# Patient Record
Sex: FEMALE | Race: WHITE | Employment: UNEMPLOYED | URBAN - METROPOLITAN AREA
[De-identification: names, ages, dates, MRNs, and addresses within clinical notes are randomized per-mention and may not be internally consistent; named-entity substitution may affect disease eponyms.]

---

## 2021-06-04 ENCOUNTER — OFFICE VISIT (OUTPATIENT)
Dept: INTERNAL MEDICINE CLINIC | Age: 31
End: 2021-06-04
Payer: COMMERCIAL

## 2021-06-04 VITALS
HEIGHT: 63 IN | DIASTOLIC BLOOD PRESSURE: 60 MMHG | WEIGHT: 123 LBS | HEART RATE: 81 BPM | OXYGEN SATURATION: 99 % | TEMPERATURE: 97.7 F | SYSTOLIC BLOOD PRESSURE: 122 MMHG | BODY MASS INDEX: 21.79 KG/M2

## 2021-06-04 DIAGNOSIS — D50.8 IRON DEFICIENCY ANEMIA SECONDARY TO INADEQUATE DIETARY IRON INTAKE: ICD-10-CM

## 2021-06-04 DIAGNOSIS — J06.9 UPPER RESPIRATORY TRACT INFECTION, UNSPECIFIED TYPE: Primary | ICD-10-CM

## 2021-06-04 DIAGNOSIS — J45.20 MILD INTERMITTENT ASTHMA WITHOUT COMPLICATION: ICD-10-CM

## 2021-06-04 PROCEDURE — 99203 OFFICE O/P NEW LOW 30 MIN: CPT | Performed by: NURSE PRACTITIONER

## 2021-06-04 RX ORDER — AZITHROMYCIN 250 MG/1
TABLET, FILM COATED ORAL
COMMUNITY
Start: 2021-06-03 | End: 2021-09-29

## 2021-06-04 RX ORDER — ALBUTEROL SULFATE 90 UG/1
2 AEROSOL, METERED RESPIRATORY (INHALATION) EVERY 6 HOURS PRN
Qty: 1 INHALER | Refills: 3 | Status: SHIPPED | OUTPATIENT
Start: 2021-06-04 | End: 2022-06-15

## 2021-06-04 RX ORDER — FLUTICASONE PROPIONATE 50 MCG
SPRAY, SUSPENSION (ML) NASAL
COMMUNITY
Start: 2021-06-03

## 2021-06-04 RX ORDER — PREDNISONE 20 MG/1
TABLET ORAL
Qty: 10 TABLET | Refills: 0 | Status: SHIPPED | OUTPATIENT
Start: 2021-06-04 | End: 2021-09-29

## 2021-06-04 RX ORDER — ACETAMINOPHEN AND CODEINE PHOSPHATE 120; 12 MG/5ML; MG/5ML
SOLUTION ORAL
COMMUNITY
Start: 2021-04-14 | End: 2021-09-29

## 2021-06-04 RX ORDER — VITAMIN C, CALCIUM, IRON, VITAMIN D3, VITAMIN E, THIAMIN, RIBOFLAVIN, NIACINAMIDE, VITAMIN B6, FOLIC ACID, IODINE, ZINC, COPPER, DOCUSATE SODIUM 120; 85; 30; 3; 20; 20; 1; 25; 2; 50; 159; 4.54; 150; 5; 400; 3.4 MG/1; MG/1; [IU]/1; MG/1; MG/1; MG/1; MG/1; MG/1; MG/1; MG/1; MG/1; MG/1; UG/1; MG/1; [IU]/1; MG/1
TABLET ORAL
COMMUNITY
Start: 2021-06-03 | End: 2022-06-15

## 2021-06-04 NOTE — PROGRESS NOTES
Stanley Gottron  1990      HPI:  Chief Complaint   Patient presents with    Congestion    Nasal Congestion     Daughter had congestion and pulling ears 6 days ago. Croup dx. Pt had sinus symptoms started x 5 days. Nasal congestion, pressure in sinus, chest congestion. Intermittent SOB/tight. No wheezing. No fever. No sore throat. Ear pressure/ L ear. Breastfeeding. Sinus rinse, IBU, Tylenol, Benadryl - minimal relief. Hx breathing treatments 2019. Steroid injection needed at that time. Hx exercise induced asthma. Hx anemia, no updated bw.     /60 (Site: Right Upper Arm, Position: Sitting, Cuff Size: Large Adult)   Pulse 81   Temp 97.7 °F (36.5 °C) (Temporal)   Ht 5' 3\" (1.6 m)   Wt 123 lb (55.8 kg)   SpO2 99%   Breastfeeding Yes   BMI 21.79 kg/m²     Prior to Visit Medications    Medication Sig Taking? Authorizing Provider   predniSONE (DELTASONE) 20 MG tablet 2 po daily x 5 days Yes DOUG Arceo CNP   albuterol sulfate  (90 Base) MCG/ACT inhaler Inhale 2 puffs into the lungs every 6 hours as needed for Wheezing Yes DOUG Reynolds CNP   azithromycin (ZITHROMAX) 250 MG tablet   Historical Provider, MD   fluticasone (FLONASE) 50 MCG/ACT nasal spray   Historical Provider, MD   norethindrone (MICRONOR) 0.35 MG tablet TAKE 1 TABLET BY MOUTH EVERY DAY  Historical Provider, MD Fiore w/o A-FeCbGl-DSS-FA-DHA (CITRANATAL 90 DHA) 90-1 & 300 MG [facility]   Historical Provider, MD     No family history on file.   Social History     Socioeconomic History    Marital status:      Spouse name: Not on file    Number of children: Not on file    Years of education: Not on file    Highest education level: Not on file   Occupational History    Not on file   Tobacco Use    Smoking status: Never Smoker    Smokeless tobacco: Never Used   Substance and Sexual Activity    Alcohol use: Yes     Comment: social    Drug use: Never    Sexual activity: Yes   Other Topics Concern    Not on file   Social History Narrative    Not on file     Social Determinants of Health     Financial Resource Strain:     Difficulty of Paying Living Expenses:    Food Insecurity:     Worried About Running Out of Food in the Last Year:     [address] in the Last Year:    Transportation Needs:     Lack of Transportation (Medical):  Lack of Transportation (Non-Medical):    Physical Activity:     Days of Exercise per Week:     Minutes of Exercise per Session:    Stress:     Feeling of Stress :    Social Connections:     Frequency of Communication with Friends and Family:     Frequency of Social Gatherings with Friends and Family:     Attends Mosque Services:     Active Member of Clubs or Organizations:     Attends Club or Organization Meetings:     Marital Status:    Intimate Partner Violence:     Fear of Current or Ex-Partner:     Emotionally Abused:     Physically Abused:     Sexually Abused:        Review of Systems   Constitutional: Negative for appetite change, chills, fatigue, fever and unexpected weight change. HENT: Positive for congestion, ear pain (L), rhinorrhea and sinus pressure. Negative for ear discharge, facial swelling, hearing loss, sneezing and sore throat. Respiratory: Positive for cough and chest tightness. Negative for shortness of breath and wheezing. Cardiovascular: Negative for chest pain. Gastrointestinal: Negative for diarrhea, nausea and vomiting. Genitourinary: Negative for difficulty urinating, dysuria, hematuria and urgency. Musculoskeletal: Negative for arthralgias and gait problem. Neurological: Negative for dizziness, weakness and headaches. Hematological: Negative for adenopathy. Psychiatric/Behavioral: Negative for sleep disturbance and suicidal ideas. Physical Exam  Vitals reviewed. Constitutional:       Appearance: She is normal weight. HENT:      Head: Normocephalic.       Right Ear: Tympanic membrane, ear canal and external ear normal.      Left Ear: Tympanic membrane, ear canal and external ear normal.      Nose: Nose normal.      Mouth/Throat:      Mouth: Mucous membranes are moist.      Pharynx: Oropharynx is clear. Eyes:      Extraocular Movements: Extraocular movements intact. Conjunctiva/sclera: Conjunctivae normal.      Pupils: Pupils are equal, round, and reactive to light. Cardiovascular:      Rate and Rhythm: Normal rate and regular rhythm. Pulses: Normal pulses. Heart sounds: Normal heart sounds. Pulmonary:      Effort: Pulmonary effort is normal.      Breath sounds: Normal breath sounds. Lymphadenopathy:      Cervical: No cervical adenopathy. Neurological:      General: No focal deficit present. Mental Status: She is alert and oriented to person, place, and time. Assessment:     1. Upper respiratory tract infection, unspecified type  Finish zpak given to pt by OBGYN. Start Prednisone and Albuterol inh prn. Use OTC supportive care, anti-histamine, Flonase NS.     - predniSONE (DELTASONE) 20 MG tablet; 2 po daily x 5 days  Dispense: 10 tablet; Refill: 0  - albuterol sulfate  (90 Base) MCG/ACT inhaler; Inhale 2 puffs into the lungs every 6 hours as needed for Wheezing  Dispense: 1 Inhaler; Refill: 3    2. Mild intermittent asthma without complication  Monitor closely. Stable. Use Albuterol inh prn    3. Iron deficiency anemia secondary to inadequate dietary iron intake  Monitor, stable. F/u bw in future with physical      Plan:    See above plan. Return if symptoms worsen or fail to improve, for Physical 2021, FBW.     Earnest Castillo, APRN - CNP

## 2021-06-07 ASSESSMENT — ENCOUNTER SYMPTOMS
NAUSEA: 0
FACIAL SWELLING: 0
DIARRHEA: 0
SORE THROAT: 0
VOMITING: 0
COUGH: 1
SINUS PRESSURE: 1
RHINORRHEA: 1
WHEEZING: 0
SHORTNESS OF BREATH: 0
CHEST TIGHTNESS: 1

## 2021-06-28 RX ORDER — VALACYCLOVIR HYDROCHLORIDE 1 G/1
2000 TABLET, FILM COATED ORAL 2 TIMES DAILY
Qty: 4 TABLET | Refills: 0 | Status: SHIPPED | OUTPATIENT
Start: 2021-06-28 | End: 2021-09-29

## 2021-09-23 ENCOUNTER — TELEPHONE (OUTPATIENT)
Dept: INTERNAL MEDICINE CLINIC | Age: 31
End: 2021-09-23

## 2021-09-23 NOTE — TELEPHONE ENCOUNTER
----- Message from Zelda Boykins sent at 9/23/2021  1:52 PM EDT -----  Subject: Message to Provider    QUESTIONS  Information for Provider? Patient called to switch her appt back to 9-29   at 1:00. Spoke with someone at  who said they would change her   appt back to the 9-29-21 at 1:00 and to let patient know it was handled. Please advise patient if not able to change appt back to the 9-29 at 1:00.   11-4 can be canceled. ---------------------------------------------------------------------------  --------------  AdolfoSnapTell INFO  What is the best way for the office to contact you? OK to leave message on   voicemail  Preferred Call Back Phone Number? [phone]  ---------------------------------------------------------------------------  --------------  SCRIPT ANSWERS  Relationship to Patient?  Self

## 2021-09-29 ENCOUNTER — TELEPHONE (OUTPATIENT)
Dept: INTERNAL MEDICINE CLINIC | Age: 31
End: 2021-09-29

## 2021-09-29 ENCOUNTER — OFFICE VISIT (OUTPATIENT)
Dept: INTERNAL MEDICINE CLINIC | Age: 31
End: 2021-09-29
Payer: COMMERCIAL

## 2021-09-29 VITALS
HEIGHT: 63 IN | DIASTOLIC BLOOD PRESSURE: 64 MMHG | HEART RATE: 82 BPM | BODY MASS INDEX: 21.26 KG/M2 | SYSTOLIC BLOOD PRESSURE: 118 MMHG | OXYGEN SATURATION: 99 % | WEIGHT: 120 LBS

## 2021-09-29 DIAGNOSIS — M62.08 DIASTASIS RECTI: Primary | ICD-10-CM

## 2021-09-29 DIAGNOSIS — N32.81 OVERACTIVE BLADDER: ICD-10-CM

## 2021-09-29 DIAGNOSIS — J45.20 MILD INTERMITTENT ASTHMA WITHOUT COMPLICATION: ICD-10-CM

## 2021-09-29 DIAGNOSIS — M62.08 DIASTASIS RECTI: ICD-10-CM

## 2021-09-29 DIAGNOSIS — Z00.00 ROUTINE GENERAL MEDICAL EXAMINATION AT A HEALTH CARE FACILITY: Primary | ICD-10-CM

## 2021-09-29 PROCEDURE — 99395 PREV VISIT EST AGE 18-39: CPT | Performed by: NURSE PRACTITIONER

## 2021-09-29 RX ORDER — VITAMIN B COMPLEX
1 CAPSULE ORAL DAILY
COMMUNITY

## 2021-09-29 RX ORDER — ZINC GLUCONATE 50 MG
50 TABLET ORAL DAILY
COMMUNITY

## 2021-09-29 RX ORDER — MAGNESIUM 30 MG
30 TABLET ORAL DAILY
COMMUNITY

## 2021-09-29 RX ORDER — OMEGA-3S/DHA/EPA/FISH OIL/D3 300MG-1000
400 CAPSULE ORAL DAILY
COMMUNITY

## 2021-09-29 ASSESSMENT — ENCOUNTER SYMPTOMS
COUGH: 0
FACIAL SWELLING: 0
NAUSEA: 0
SINUS PRESSURE: 0
DIARRHEA: 0
VOMITING: 0
SORE THROAT: 0

## 2021-09-29 ASSESSMENT — PATIENT HEALTH QUESTIONNAIRE - PHQ9
1. LITTLE INTEREST OR PLEASURE IN DOING THINGS: 0
SUM OF ALL RESPONSES TO PHQ9 QUESTIONS 1 & 2: 0
2. FEELING DOWN, DEPRESSED OR HOPELESS: 0
SUM OF ALL RESPONSES TO PHQ QUESTIONS 1-9: 0

## 2021-09-29 NOTE — PROGRESS NOTES
Karla Gonzalez  1990        Chief Complaint   Patient presents with    Annual Exam    Gynecologic Exam     [address]     Immunizations     going to get records        Assessment/Plan:     1. Routine general medical examination at a health care facility  - Labs   - CBC Auto Differential; Future   - Comprehensive Metabolic Panel; Future   - Lipid Panel; Future  - Flu vaccine recommended and educated pt   - COVID vaccine recommended and educated pt    2. Overactive bladder  - Monitor, symptoms stable. No medication at this time. 3. Mild intermittent asthma without complication  - Stable, Albuterol PRN    Diastasis Recti  Will review with PT and see if we can get patient established with PT    HPI:   Doing well overall. Has 3 children, all girls under the ages of 8. She is still breastfeeding. Overactive bladder. Dx freshman year in college and was on medication. She got off medications due to pregnancy and breastfeeding. Asthma. Stable. Denies cough/SOB. Pap smear: 2021  Flu vaccine: due      /64 (Site: Right Upper Arm, Position: Sitting, Cuff Size: Large Adult)   Pulse 82   Ht 5' 3\" (1.6 m)   Wt 120 lb (54.4 kg)   LMP  (Approximate)   SpO2 99%   Breastfeeding Yes   BMI 21.26 kg/m²     Prior to Visit Medications    Medication Sig Taking? Authorizing Provider   vitamin D3 (CHOLECALCIFEROL) 10 MCG (400 UNIT) TABS tablet Take 400 Units by mouth daily Yes Historical Provider, MD   b complex vitamins capsule Take 1 capsule by mouth daily Yes Historical Provider, MD   zinc gluconate 50 MG tablet Take 50 mg by mouth daily Yes Historical Provider, MD   magnesium 30 MG tablet Take 30 mg by mouth daily Yes Historical Provider, MD   ELDERBERRY PO Take by mouth daily Yes Historical Provider, MD   fluticasone (FLONASE) 50 MCG/ACT nasal spray Several times per year.  Spring and fall Yes Historical Provider, MD Fiore w/o A-FeCbGl-DSS-FA-DHA (CITRANATAL 90 DHA) 90-1 & 300 MG MISC  Yes Thought Content: Thought content normal.             See above plan.      Return in about 1 year (around 9/29/2022) for Physical.    DOUG Mitchell - CNP

## 2021-09-30 NOTE — TELEPHONE ENCOUNTER
Pt informed she would like the referral . She has the scheduling information.  She would like to go to [place]

## 2021-09-30 NOTE — TELEPHONE ENCOUNTER
Please tell patient that she could perform a few sessions with Pelvic Floor Therapy at [facility] location.  If she would like to, I can send referral.

## 2021-10-12 ENCOUNTER — TELEPHONE (OUTPATIENT)
Dept: INTERNAL MEDICINE CLINIC | Age: 31
End: 2021-10-12

## 2021-10-13 ENCOUNTER — TELEPHONE (OUTPATIENT)
Dept: INTERNAL MEDICINE CLINIC | Age: 31
End: 2021-10-13

## 2021-10-13 ENCOUNTER — HOSPITAL ENCOUNTER (OUTPATIENT)
Age: 31
Discharge: HOME | End: 2021-10-13
Payer: COMMERCIAL

## 2021-10-13 DIAGNOSIS — Z86.16 HISTORY OF COVID-19: Primary | ICD-10-CM

## 2021-10-13 DIAGNOSIS — Z00.00 ROUTINE GENERAL MEDICAL EXAMINATION AT A HEALTH CARE FACILITY: ICD-10-CM

## 2021-10-13 LAB
A/G RATIO: 2 (ref 1.1–2.2)
ALBUMIN SERPL-MCNC: 4.9 G/DL (ref 3.4–5)
ALP BLD-CCNC: 83 U/L (ref 40–129)
ALT SERPL-CCNC: 19 U/L (ref 10–40)
ANION GAP SERPL CALCULATED.3IONS-SCNC: 10 MMOL/L (ref 3–16)
AST SERPL-CCNC: 18 U/L (ref 15–37)
BASOPHILS ABSOLUTE: 0 K/UL (ref 0–0.2)
BASOPHILS RELATIVE PERCENT: 0.6 %
BILIRUB SERPL-MCNC: 0.4 MG/DL (ref 0–1)
BUN BLDV-MCNC: 16 MG/DL (ref 7–20)
CALCIUM SERPL-MCNC: 9.8 MG/DL (ref 8.3–10.6)
CHLORIDE BLD-SCNC: 103 MMOL/L (ref 99–110)
CHOLESTEROL, TOTAL: 192 MG/DL (ref 0–199)
CO2: 28 MMOL/L (ref 21–32)
CREAT SERPL-MCNC: 0.7 MG/DL (ref 0.6–1.1)
EOSINOPHILS ABSOLUTE: 0.1 K/UL (ref 0–0.6)
EOSINOPHILS RELATIVE PERCENT: 1.5 %
GFR AFRICAN AMERICAN: >60
GFR NON-AFRICAN AMERICAN: >60
GLOBULIN: 2.4 G/DL
GLUCOSE BLD-MCNC: 80 MG/DL (ref 70–99)
HCT VFR BLD CALC: 39.5 % (ref 36–48)
HDLC SERPL-MCNC: 65 MG/DL (ref 40–60)
HEMOGLOBIN: 13.2 G/DL (ref 12–16)
LDL CHOLESTEROL CALCULATED: 117 MG/DL
LYMPHOCYTES ABSOLUTE: 2.3 K/UL (ref 1–5.1)
LYMPHOCYTES RELATIVE PERCENT: 40.4 %
MCH RBC QN AUTO: 30.2 PG (ref 26–34)
MCHC RBC AUTO-ENTMCNC: 33.4 G/DL (ref 31–36)
MCV RBC AUTO: 90.5 FL (ref 80–100)
MONOCYTES ABSOLUTE: 0.5 K/UL (ref 0–1.3)
MONOCYTES RELATIVE PERCENT: 7.9 %
NEUTROPHILS ABSOLUTE: 2.9 K/UL (ref 1.7–7.7)
NEUTROPHILS RELATIVE PERCENT: 49.6 %
PDW BLD-RTO: 13 % (ref 12.4–15.4)
PLATELET # BLD: 193 K/UL (ref 135–450)
PMV BLD AUTO: 8.8 FL (ref 5–10.5)
POTASSIUM SERPL-SCNC: 4.3 MMOL/L (ref 3.5–5.1)
RBC # BLD: 4.37 M/UL (ref 4–5.2)
SARS-COV-2 ANTIBODY, TOTAL: POSITIVE
SODIUM BLD-SCNC: 141 MMOL/L (ref 136–145)
TOTAL PROTEIN: 7.3 G/DL (ref 6.4–8.2)
TRIGL SERPL-MCNC: 49 MG/DL (ref 0–150)
VLDLC SERPL CALC-MCNC: 10 MG/DL
WBC # BLD: 5.8 K/UL (ref 4–11)

## 2021-10-13 PROCEDURE — 85025 COMPLETE CBC W/AUTO DIFF WBC: CPT

## 2021-10-13 PROCEDURE — 86769 SARS-COV-2 COVID-19 ANTIBODY: CPT

## 2021-10-13 PROCEDURE — 80053 COMPREHEN METABOLIC PANEL: CPT

## 2021-10-13 PROCEDURE — 36415 COLL VENOUS BLD VENIPUNCTURE: CPT

## 2021-10-13 PROCEDURE — 80061 LIPID PANEL: CPT

## 2021-10-13 NOTE — TELEPHONE ENCOUNTER
Spoke with pt , she wants to go ahead and get the test.   She was covid testing earlier in the year . She declines the vaccine due to having had covid. Unsure of exact date of exposure / sx .

## 2021-10-13 NOTE — TELEPHONE ENCOUNTER
I can add COVID antibody testing however I like patients to know that this may not be covered by insurance as no matter where the antibody testing returns for level, the recommendation is a COVID vaccine. So she might want to find out if insurance will cover.

## 2022-06-15 ENCOUNTER — OFFICE VISIT (OUTPATIENT)
Dept: INTERNAL MEDICINE CLINIC | Age: 32
End: 2022-06-15
Payer: COMMERCIAL

## 2022-06-15 ENCOUNTER — TELEPHONE (OUTPATIENT)
Dept: INTERNAL MEDICINE CLINIC | Age: 32
End: 2022-06-15

## 2022-06-15 VITALS
BODY MASS INDEX: 23.37 KG/M2 | SYSTOLIC BLOOD PRESSURE: 118 MMHG | HEART RATE: 80 BPM | DIASTOLIC BLOOD PRESSURE: 80 MMHG | OXYGEN SATURATION: 99 % | HEIGHT: 62 IN | RESPIRATION RATE: 12 BRPM | WEIGHT: 127 LBS | TEMPERATURE: 98.1 F

## 2022-06-15 DIAGNOSIS — R00.0 TACHYCARDIA: Primary | ICD-10-CM

## 2022-06-15 LAB
A/G RATIO: 2.1 (ref 1.1–2.2)
ALBUMIN SERPL-MCNC: 4.9 G/DL (ref 3.4–5)
ALP BLD-CCNC: 69 U/L (ref 40–129)
ALT SERPL-CCNC: 23 U/L (ref 10–40)
ANION GAP SERPL CALCULATED.3IONS-SCNC: 16 MMOL/L (ref 3–16)
AST SERPL-CCNC: 23 U/L (ref 15–37)
BILIRUB SERPL-MCNC: 0.4 MG/DL (ref 0–1)
BUN BLDV-MCNC: 14 MG/DL (ref 7–20)
CALCIUM SERPL-MCNC: 9.7 MG/DL (ref 8.3–10.6)
CHLORIDE BLD-SCNC: 98 MMOL/L (ref 99–110)
CO2: 24 MMOL/L (ref 21–32)
CREAT SERPL-MCNC: 0.6 MG/DL (ref 0.6–1.1)
GFR AFRICAN AMERICAN: >60
GFR NON-AFRICAN AMERICAN: >60
GLUCOSE BLD-MCNC: 62 MG/DL (ref 70–99)
MAGNESIUM: 2.1 MG/DL (ref 1.8–2.4)
POTASSIUM SERPL-SCNC: 3.6 MMOL/L (ref 3.5–5.1)
SODIUM BLD-SCNC: 138 MMOL/L (ref 136–145)
TOTAL PROTEIN: 7.2 G/DL (ref 6.4–8.2)
TSH REFLEX: 0.9 UIU/ML (ref 0.27–4.2)

## 2022-06-15 PROCEDURE — 93000 ELECTROCARDIOGRAM COMPLETE: CPT | Performed by: NURSE PRACTITIONER

## 2022-06-15 PROCEDURE — 99214 OFFICE O/P EST MOD 30 MIN: CPT | Performed by: NURSE PRACTITIONER

## 2022-06-15 SDOH — ECONOMIC STABILITY: FOOD INSECURITY: WITHIN THE PAST 12 MONTHS, YOU WORRIED THAT YOUR FOOD WOULD RUN OUT BEFORE YOU GOT MONEY TO BUY MORE.: NEVER TRUE

## 2022-06-15 SDOH — ECONOMIC STABILITY: FOOD INSECURITY: WITHIN THE PAST 12 MONTHS, THE FOOD YOU BOUGHT JUST DIDN'T LAST AND YOU DIDN'T HAVE MONEY TO GET MORE.: NEVER TRUE

## 2022-06-15 ASSESSMENT — ANXIETY QUESTIONNAIRES
6. BECOMING EASILY ANNOYED OR IRRITABLE: 0
7. FEELING AFRAID AS IF SOMETHING AWFUL MIGHT HAPPEN: 0
GAD7 TOTAL SCORE: 0
1. FEELING NERVOUS, ANXIOUS, OR ON EDGE: 0
IF YOU CHECKED OFF ANY PROBLEMS ON THIS QUESTIONNAIRE, HOW DIFFICULT HAVE THESE PROBLEMS MADE IT FOR YOU TO DO YOUR WORK, TAKE CARE OF THINGS AT HOME, OR GET ALONG WITH OTHER PEOPLE: NOT DIFFICULT AT ALL
5. BEING SO RESTLESS THAT IT IS HARD TO SIT STILL: 0
2. NOT BEING ABLE TO STOP OR CONTROL WORRYING: 0
3. WORRYING TOO MUCH ABOUT DIFFERENT THINGS: 0
4. TROUBLE RELAXING: 0

## 2022-06-15 ASSESSMENT — SOCIAL DETERMINANTS OF HEALTH (SDOH): HOW HARD IS IT FOR YOU TO PAY FOR THE VERY BASICS LIKE FOOD, HOUSING, MEDICAL CARE, AND HEATING?: NOT HARD AT ALL

## 2022-06-15 ASSESSMENT — PATIENT HEALTH QUESTIONNAIRE - PHQ9
10. IF YOU CHECKED OFF ANY PROBLEMS, HOW DIFFICULT HAVE THESE PROBLEMS MADE IT FOR YOU TO DO YOUR WORK, TAKE CARE OF THINGS AT HOME, OR GET ALONG WITH OTHER PEOPLE: 0
SUM OF ALL RESPONSES TO PHQ QUESTIONS 1-9: 0
SUM OF ALL RESPONSES TO PHQ QUESTIONS 1-9: 0
8. MOVING OR SPEAKING SO SLOWLY THAT OTHER PEOPLE COULD HAVE NOTICED. OR THE OPPOSITE, BEING SO FIGETY OR RESTLESS THAT YOU HAVE BEEN MOVING AROUND A LOT MORE THAN USUAL: 0
3. TROUBLE FALLING OR STAYING ASLEEP: 0
SUM OF ALL RESPONSES TO PHQ QUESTIONS 1-9: 0
1. LITTLE INTEREST OR PLEASURE IN DOING THINGS: 0
SUM OF ALL RESPONSES TO PHQ QUESTIONS 1-9: 0
6. FEELING BAD ABOUT YOURSELF - OR THAT YOU ARE A FAILURE OR HAVE LET YOURSELF OR YOUR FAMILY DOWN: 0
4. FEELING TIRED OR HAVING LITTLE ENERGY: 0
2. FEELING DOWN, DEPRESSED OR HOPELESS: 0
5. POOR APPETITE OR OVEREATING: 0
7. TROUBLE CONCENTRATING ON THINGS, SUCH AS READING THE NEWSPAPER OR WATCHING TELEVISION: 0
9. THOUGHTS THAT YOU WOULD BE BETTER OFF DEAD, OR OF HURTING YOURSELF: 0
SUM OF ALL RESPONSES TO PHQ9 QUESTIONS 1 & 2: 0

## 2022-06-15 ASSESSMENT — ENCOUNTER SYMPTOMS
DIARRHEA: 0
VOMITING: 0
CHEST TIGHTNESS: 0
SHORTNESS OF BREATH: 0
NAUSEA: 0
CONSTIPATION: 0
ABDOMINAL DISTENTION: 0

## 2022-06-15 NOTE — TELEPHONE ENCOUNTER
Nurse triage / complaint of random dizzy spells an increased heart rate. Increase in the past few weeks. Was recently on a plane trip. Denies any cold / viral sx. Has increased food and hydration . She was able to play a round of [place] yesterday [place] [employer] . While there she had an episode . Felt close to passing out but did not. Apple watch is showing increased heart rate 160's. She has had a few episodes of Nausea , no vomiting. She has a hx of low iron . Patient instructed to keep hydrated and scheduled with Cherri Gregorio at 2:40 today for evaluation.

## 2022-06-15 NOTE — PROGRESS NOTES
[facility]  [address]  Merrill Velasquez is a 36 y.o. female who presents today for her medical conditions/complaints as noted below. Angel Lopez is c/o of Other, Dizziness, and Anxiety (Has situational anxiety)      Chief Complaint   Patient presents with    Other    Dizziness    Anxiety     Has situational anxiety       HPI:     Palpitations    Patient complains of dizziness, a fluttering sensation, palpitations and tachycardia over several episodes throughout the past 2-3 weeks. The symptoms are moderate in severity, occuring intermittently and lasting several minutes per episode. Cardiac risk factors include: none. Aggravating factors: caffeine, stress/anxiety. Alleviating factors: Valsalva. Associated symptoms: chest pressure/discomfort, exertional chest pressure/discomfort, irregular heart beat, palpitations and near syncope. Patient denies: chest pain, claudication, dyspnea, exertional chest pressure/discomfort, fatigue and paroxysmal nocturnal dyspnea. She states her most recent episode of tachycardia occurred after a round of golf yesterday. She states her golfing league only golfed 7 holes due to the heat (heat index >100F) and returned to the clubhouse. She was eating dinner and spontaneously felt very clammy, sweaty, and lightheaded in which her friend noted a significant color change in her face. She managed to check her apple watch which stated her heart rate was >160 bpm. She states she felt nauseous and vomited which happened to reverse the symptoms. Her other episodes she has experienced has largely been random and not associated with activity/exertion. She states she is overall healthy. She eats regular meals, exercises several days per week. Tries to avoid excess caffeine, however does have a coffee daily.  She states she will occasionally drink socially, however only 1-2 drinks. She did have an alcoholic drink when she was golfing yesterday. She hydrates with roughly 60 oz water daily. Denies FH of cardiac disease, however does state there is a paternal FH of hypothyroidism. No past medical history on file. Past Surgical History:   Procedure Laterality Date    SHOULDER SURGERY      UMBILICAL HERNIA REPAIR         Family History   Problem Relation Age of Onset    Ulcerative Colitis Mother     Mental Illness Mother     Other Maternal Grandmother     Other Paternal Grandmother         Alzheimer's - 67 y/o    Coronary Art Dis Paternal Grandfather        Social History     Tobacco Use    Smoking status: Never Smoker    Smokeless tobacco: Never Used   Substance Use Topics    Alcohol use: Yes     Comment: social        Current Outpatient Medications   Medication Sig Dispense Refill    vitamin D3 (CHOLECALCIFEROL) 10 MCG (400 UNIT) TABS tablet Take 400 Units by mouth daily      b complex vitamins capsule Take 1 capsule by mouth daily      zinc gluconate 50 MG tablet Take 50 mg by mouth daily      magnesium 30 MG tablet Take 30 mg by mouth daily      fluticasone (FLONASE) 50 MCG/ACT nasal spray Several times per year. Spring and fall       No current facility-administered medications for this visit. Allergies   Allergen Reactions    Other Hives       Subjective:      Review of Systems   Constitutional: Positive for diaphoresis. Negative for activity change, fatigue and unexpected weight change. Respiratory: Negative for chest tightness and shortness of breath. Cardiovascular: Positive for palpitations. Negative for chest pain and leg swelling. Gastrointestinal: Negative for abdominal distention, constipation, diarrhea, nausea and vomiting. Genitourinary: Negative for difficulty urinating and urgency. Skin: Negative for rash. Neurological: Positive for dizziness, syncope, light-headedness and headaches. Negative for weakness.        Objective: Vitals:    06/15/22 1433   BP: 118/80   Site: Right Upper Arm   Position: Sitting   Cuff Size: Medium Adult   Pulse: 80   Resp: 12   Temp: 98.1 °F (36.7 °C)   TempSrc: Temporal   SpO2: 99%   Weight: 127 lb (57.6 kg)   Height: 5' 2\" (1.575 m)       Physical Exam  Constitutional:       Appearance: Normal appearance. She is well-developed. HENT:      Head: Normocephalic. Right Ear: Hearing and external ear normal.      Left Ear: Hearing and external ear normal.      Nose: Nose normal.   Eyes:      General: Lids are normal. Lids are everted, no foreign bodies appreciated. Conjunctiva/sclera: Conjunctivae normal.      Pupils: Pupils are equal, round, and reactive to light. Neck:      Thyroid: No thyroid mass. Vascular: Normal carotid pulses. No carotid bruit or JVD. Cardiovascular:      Rate and Rhythm: Normal rate and regular rhythm. No extrasystoles are present. Chest Wall: PMI is not displaced. Pulses:           Radial pulses are 2+ on the right side and 2+ on the left side. Dorsalis pedis pulses are 2+ on the right side and 2+ on the left side. Heart sounds: Normal heart sounds, S1 normal and S2 normal. Heart sounds not distant. No murmur heard. No systolic murmur is present. No diastolic murmur is present. No friction rub. No gallop. No S3 or S4 sounds. Pulmonary:      Effort: Pulmonary effort is normal. No respiratory distress. Breath sounds: Normal breath sounds. No decreased breath sounds, wheezing, rhonchi or rales. Abdominal:      General: Bowel sounds are normal. There is no distension. Palpations: Abdomen is soft. Tenderness: There is no abdominal tenderness. There is no rebound. Musculoskeletal:         General: Normal range of motion. Cervical back: Full passive range of motion without pain, normal range of motion and neck supple. Right lower leg: No edema. Left lower leg: No edema.    Skin:     General: Skin is warm and dry.   Neurological:      Cranial Nerves: No cranial nerve deficit. Psychiatric:         Speech: Speech normal.         Behavior: Behavior normal.         Thought Content: Thought content normal.         Judgment: Judgment normal.         Assessment & Plan: The following diagnoses and conditions are stable with no further orders unless indicated:    1. Tachycardia        Steven was seen today for other, dizziness and anxiety. Diagnoses and all orders for this visit:    Tachycardia  -     Comprehensive Metabolic Panel; Future  -     CBC with Auto Differential; Future  -     EKG 12 Lead  -     Cardiac event monitor; Future  -     TSH with Reflex; Future  -     Magnesium; Future  -     TSH with Reflex  -     Comprehensive Metabolic Panel  -     CBC with Auto Differential  -     Magnesium    Etiology unclear, however concerning given excessive HR. Will screen for potential causes with serologic info. EKG benign in office. Would benefit from Holter monitor for extended review. Encouraged increasing water intake to roughly 80 oz water daily. Avoid caffeine, alcohol for the time being. Avoid excessive strenuous activity to rest the heart and prevent undue stress. Educated regarding stress reduction techniques to help relax as well. Educated regarding vagal maneuvers if her heart rate elevates again. Consider cardiology referral    Return if symptoms worsen or fail to improve. Patientshould call the office immediately with new or ongoing signs or symptoms or worsening, or proceed to the emergency room. If you are on medications which could impair your senses, you are at risk of weakness, falls,dizziness, or drowsiness. You should be careful during activities which could place you at risk of harm, such as climbing, using stairs, operating machinery, or driving vehicles. If you feel you cannot safely do theseactivities, you should request others to help you, or avoid the activities altogether.  If you are drowsy for any other reason, you should use the same precautions as listed above. Call if pattern of symptoms change or persists for an extended time.       Ruthanne Lennox

## 2022-06-16 ENCOUNTER — TELEPHONE (OUTPATIENT)
Dept: INTERNAL MEDICINE CLINIC | Age: 32
End: 2022-06-16

## 2022-06-16 ENCOUNTER — NURSE ONLY (OUTPATIENT)
Dept: CARDIOLOGY CLINIC | Age: 32
End: 2022-06-16

## 2022-06-16 DIAGNOSIS — R42 DIZZY: Primary | ICD-10-CM

## 2022-06-16 LAB
BASOPHILS ABSOLUTE: 0 K/UL (ref 0–0.2)
BASOPHILS RELATIVE PERCENT: 0.5 %
EOSINOPHILS ABSOLUTE: 0.1 K/UL (ref 0–0.6)
EOSINOPHILS RELATIVE PERCENT: 1.1 %
HCT VFR BLD CALC: 38.6 % (ref 36–48)
HEMOGLOBIN: 13.2 G/DL (ref 12–16)
LYMPHOCYTES ABSOLUTE: 2.5 K/UL (ref 1–5.1)
LYMPHOCYTES RELATIVE PERCENT: 31.4 %
MCH RBC QN AUTO: 31.2 PG (ref 26–34)
MCHC RBC AUTO-ENTMCNC: 34.2 G/DL (ref 31–36)
MCV RBC AUTO: 91 FL (ref 80–100)
MONOCYTES ABSOLUTE: 0.4 K/UL (ref 0–1.3)
MONOCYTES RELATIVE PERCENT: 5.7 %
NEUTROPHILS ABSOLUTE: 4.8 K/UL (ref 1.7–7.7)
NEUTROPHILS RELATIVE PERCENT: 61.3 %
PDW BLD-RTO: 13.7 % (ref 12.4–15.4)
PLATELET # BLD: 220 K/UL (ref 135–450)
PMV BLD AUTO: 8.5 FL (ref 5–10.5)
RBC # BLD: 4.24 M/UL (ref 4–5.2)
WBC # BLD: 7.9 K/UL (ref 4–11)

## 2022-06-16 PROCEDURE — 93242 EXT ECG>48HR<7D RECORDING: CPT | Performed by: INTERNAL MEDICINE

## 2022-06-16 NOTE — TELEPHONE ENCOUNTER
Andrews/Carmen 7 day monitor ordered for pt  Orderd by: [place] NP-Family [facility]  Date: 06/16/22  Place: [place] office  Placed by: Ralph Barroso LPN      Pt given verbal instructions on how monitor works, how to report cardiac symptoms on monitor and where to return monitor (return back to [place] office) once prescribed wear time has been completed. Pt verbalized understanding of all instructions given.       CAM ID: [id number]

## 2022-07-02 PROCEDURE — 93244 EXT ECG>48HR<7D REV&INTERPJ: CPT | Performed by: INTERNAL MEDICINE

## 2022-07-13 ENCOUNTER — TELEPHONE (OUTPATIENT)
Dept: INTERNAL MEDICINE CLINIC | Age: 32
End: 2022-07-13

## 2022-07-13 NOTE — TELEPHONE ENCOUNTER
Phoned the office and the monitor readings have not been scanned into the computer. A message will be sent back to have the monitor scanned and read.

## 2022-07-14 DIAGNOSIS — R42 DIZZY: ICD-10-CM

## 2022-09-06 ENCOUNTER — OFFICE VISIT (OUTPATIENT)
Dept: INTERNAL MEDICINE CLINIC | Age: 32
End: 2022-09-06
Payer: COMMERCIAL

## 2022-09-06 ENCOUNTER — TELEPHONE (OUTPATIENT)
Dept: INTERNAL MEDICINE CLINIC | Age: 32
End: 2022-09-06

## 2022-09-06 VITALS
WEIGHT: 118 LBS | SYSTOLIC BLOOD PRESSURE: 128 MMHG | BODY MASS INDEX: 21.58 KG/M2 | TEMPERATURE: 97.2 F | DIASTOLIC BLOOD PRESSURE: 60 MMHG

## 2022-09-06 DIAGNOSIS — R19.7 DIARRHEA, UNSPECIFIED TYPE: Primary | ICD-10-CM

## 2022-09-06 DIAGNOSIS — L29.0 ANAL ITCHING: ICD-10-CM

## 2022-09-06 PROCEDURE — 99213 OFFICE O/P EST LOW 20 MIN: CPT | Performed by: NURSE PRACTITIONER

## 2022-09-06 RX ORDER — FLUCONAZOLE 150 MG/1
150 TABLET ORAL ONCE
Qty: 1 TABLET | Refills: 0 | Status: SHIPPED | OUTPATIENT
Start: 2022-09-06 | End: 2022-09-06

## 2022-09-06 ASSESSMENT — ENCOUNTER SYMPTOMS
RECTAL PAIN: 1
SHORTNESS OF BREATH: 0
EYE DISCHARGE: 0
BLOOD IN STOOL: 0
DIARRHEA: 1
WHEEZING: 0
CONSTIPATION: 0
VOMITING: 0
ABDOMINAL PAIN: 0
NAUSEA: 0
COUGH: 0

## 2022-09-06 NOTE — PROGRESS NOTES
History    Not on file   Tobacco Use    Smoking status: Never    Smokeless tobacco: Never   Vaping Use    Vaping Use: Never used   Substance and Sexual Activity    Alcohol use: Yes     Comment: social    Drug use: Never    Sexual activity: Yes   Other Topics Concern    Not on file   Social History Narrative    Not on file     Social Determinants of Health     Financial Resource Strain: Low Risk     Difficulty of Paying Living Expenses: Not hard at all   Food Insecurity: No Food Insecurity    Worried About Running Out of Food in the Last Year: Never true    Ran Out of Food in the Last Year: Never true   Transportation Needs: Not on file   Physical Activity: Not on file   Stress: Not on file   Social Connections: Not on file   Intimate Partner Violence: Not on file   Housing Stability: Not on file       Family History   Problem Relation Age of Onset    Ulcerative Colitis Mother     Mental Illness Mother     Other Maternal Grandmother     Other Paternal Grandmother         Alzheimer's - 65 y/o    Coronary Art Dis Paternal Grandfather        No past medical history on file. Review of Systems   Constitutional:  Negative for fever. HENT:  Negative for congestion. Eyes:  Negative for discharge. Respiratory:  Negative for cough, shortness of breath and wheezing. Cardiovascular:  Negative for chest pain, palpitations and leg swelling. Gastrointestinal:  Positive for diarrhea and rectal pain (itching not pain). Negative for abdominal pain, blood in stool, constipation, nausea and vomiting. Genitourinary:  Negative for dysuria and hematuria. Musculoskeletal:  Negative for myalgias. Skin:  Negative for rash. Neurological:  Negative for dizziness. Psychiatric/Behavioral:  The patient is not nervous/anxious. Physical Exam  Constitutional:       General: She is not in acute distress. Appearance: She is not diaphoretic.    HENT:      Right Ear: External ear normal.      Left Ear: External ear normal.      Mouth/Throat:      Pharynx: No oropharyngeal exudate. Eyes:      General: No scleral icterus. Right eye: No discharge. Left eye: No discharge. Neck:      Thyroid: No thyromegaly. Cardiovascular:      Rate and Rhythm: Normal rate and regular rhythm. Heart sounds: Normal heart sounds. No murmur heard. No friction rub. No gallop. Pulmonary:      Effort: Pulmonary effort is normal.      Breath sounds: Normal breath sounds. No wheezing. Abdominal:      General: Bowel sounds are normal. There is no distension. Palpations: Abdomen is soft. Tenderness: There is no abdominal tenderness. Genitourinary:     Comments: Refused anal exam  Musculoskeletal:         General: No tenderness. Normal range of motion. Cervical back: Normal range of motion. Lymphadenopathy:      Cervical: No cervical adenopathy. Skin:     General: Skin is warm and dry. Findings: No erythema or rash. Comments: Left armpit with slightly red bump approx. 1mm in size. Skin integrity intact. Neurological:      Mental Status: She is alert and oriented to person, place, and time.    Psychiatric:         Judgment: Judgment normal.              Tito Cummings, APRN - CNP

## 2022-09-06 NOTE — TELEPHONE ENCOUNTER
Patient called the office and requested to speak with the  regarding her office visit today. Spoke with Merna Ferreira and Kaylene Hyde to gather information regarding the visit and patient history. Called patient and left a voice message to advise her that I had spoken with both Sarah Ortiz and Yadira and if she still wished to speak with me she should call me at the office tomorrow during business hours. Return call phone number provided of Voice message. Message OK per hipaa.

## 2022-09-07 ENCOUNTER — HOSPITAL ENCOUNTER (OUTPATIENT)
Age: 32
Setting detail: SPECIMEN
Discharge: HOME | End: 2022-09-07
Payer: COMMERCIAL

## 2022-09-07 ENCOUNTER — TELEPHONE (OUTPATIENT)
Dept: INTERNAL MEDICINE CLINIC | Age: 32
End: 2022-09-07

## 2022-09-07 DIAGNOSIS — R19.7 DIARRHEA, UNSPECIFIED TYPE: ICD-10-CM

## 2022-09-07 PROCEDURE — 87328 CRYPTOSPORIDIUM AG IA: CPT

## 2022-09-07 PROCEDURE — 87336 ENTAMOEB HIST DISPR AG IA: CPT

## 2022-09-07 PROCEDURE — 87506 IADNA-DNA/RNA PROBE TQ 6-11: CPT

## 2022-09-07 NOTE — TELEPHONE ENCOUNTER
Spoke with patient she states that now there is a rash associated . The rash is all external and down onto legs/thighs. Rash is also present under the arms. We discussed OB and she does not want to go to OB/Gyn as her provider has retired and she has not established with another provider. She is picking up the stool kits today . Has picked up the Diflucan. She is concerned with a family history of GI issues and wonders if this could be a correlation. I have reset her Mychart and have her send pictures of the rash. I had patient check mychart while I was on the phone with her . She was able to access and will forward pictures. She was able to get an appointment with Derm for tomorrow.

## 2022-09-08 LAB
CRYPTOSPORIDIUM ANTIGEN STOOL: NORMAL
E HISTOLYTICA ANTIGEN STOOL: NORMAL
GI BACTERIAL PATHOGENS BY PCR: NORMAL
GIARDIA ANTIGEN STOOL: NORMAL

## 2022-12-08 ENCOUNTER — TELEPHONE (OUTPATIENT)
Dept: INTERNAL MEDICINE CLINIC | Age: 32
End: 2022-12-08

## 2022-12-08 NOTE — TELEPHONE ENCOUNTER
Patient calling stating that she went to urgent care last night with complaints of tightness in chest and burning sensations. Was given a steroid and albuterol, the nurse practitioner at the urgent care told patient that she could get a steroid shot, she was wanting to know if she can get it here instead of going back to the urgent care. The patient states that she doesn't feel that the albuterol is helping very much. The patient is worried that this is going to turn in to bronchitis.

## 2022-12-09 NOTE — TELEPHONE ENCOUNTER
Would suggest continuing current treatment as she is early in treatment cycle. If symptoms continue through to Monday, could see virtually. Did she have a CXR?

## 2022-12-09 NOTE — TELEPHONE ENCOUNTER
Patient calling back, went over recommendations of continuing the current treatment. She also states that she did not have the CXR yet.

## 2022-12-09 NOTE — TELEPHONE ENCOUNTER
Called and informed patient, she states that she tool all of day 1 and only 1 tablet of day 2 so far. She states that she woke up congested and has a cough now, still experiencing the chest pain. She states that she doesn't feel like the medication is helping.                    Please advise

## 2022-12-09 NOTE — TELEPHONE ENCOUNTER
Generally a medrol dosepack takes 1-2 days to become most effective in relieving inflammation.  Can have steroid injection if this is not effective

## 2022-12-10 ENCOUNTER — TELEPHONE (OUTPATIENT)
Dept: INTERNAL MEDICINE CLINIC | Age: 32
End: 2022-12-10

## 2022-12-10 DIAGNOSIS — R07.9 CHEST PAIN, UNSPECIFIED TYPE: ICD-10-CM

## 2022-12-10 DIAGNOSIS — R05.1 ACUTE COUGH: Primary | ICD-10-CM

## 2022-12-10 NOTE — TELEPHONE ENCOUNTER
Pt called After Hours line c/o waking up about 5am with worse cough and stinging in her chest. She was seen in Urgent Care a few days ago and was given Medrol dose sherman so still on that. Pt had called office yesterday and was speaking to a nurse /Shawn about sx and CXR was suggested if she had not had one done, which she has not. Pt asking if this can be ordered. She did make an appt at [address] [facility] this afternoon and she thinks they may have X-ray capability there. Advised her to keep the Urgent Care appt. If CXR not performed there then pt will go to [facility] afterward for CXR- order placed. Pt will call back in this scenario so I know to look for results.

## 2022-12-12 NOTE — TELEPHONE ENCOUNTER
[phone] (home)   Left message for patient, she is to continue current treatment and get the xray if sx do not continue to improve.

## 2023-02-06 ENCOUNTER — OFFICE VISIT (OUTPATIENT)
Dept: INTERNAL MEDICINE CLINIC | Age: 33
End: 2023-02-06
Payer: COMMERCIAL

## 2023-02-06 ENCOUNTER — HOSPITAL ENCOUNTER (OUTPATIENT)
Age: 33
Discharge: HOME | End: 2023-02-06
Payer: COMMERCIAL

## 2023-02-06 VITALS
SYSTOLIC BLOOD PRESSURE: 108 MMHG | WEIGHT: 127.8 LBS | DIASTOLIC BLOOD PRESSURE: 68 MMHG | HEART RATE: 81 BPM | HEIGHT: 62 IN | BODY MASS INDEX: 23.52 KG/M2 | RESPIRATION RATE: 18 BRPM | OXYGEN SATURATION: 98 %

## 2023-02-06 DIAGNOSIS — L30.9 DERMATITIS: ICD-10-CM

## 2023-02-06 DIAGNOSIS — Z00.00 ROUTINE GENERAL MEDICAL EXAMINATION AT A HEALTH CARE FACILITY: Primary | ICD-10-CM

## 2023-02-06 DIAGNOSIS — Z00.00 ROUTINE GENERAL MEDICAL EXAMINATION AT A HEALTH CARE FACILITY: ICD-10-CM

## 2023-02-06 DIAGNOSIS — D50.8 IRON DEFICIENCY ANEMIA SECONDARY TO INADEQUATE DIETARY IRON INTAKE: ICD-10-CM

## 2023-02-06 DIAGNOSIS — N32.81 OVERACTIVE BLADDER: ICD-10-CM

## 2023-02-06 DIAGNOSIS — J45.20 MILD INTERMITTENT ASTHMA WITHOUT COMPLICATION: ICD-10-CM

## 2023-02-06 LAB
A/G RATIO: 1.9 (ref 1.1–2.2)
ALBUMIN SERPL-MCNC: 5.2 G/DL (ref 3.4–5)
ALP BLD-CCNC: 70 U/L (ref 40–129)
ALT SERPL-CCNC: 34 U/L (ref 10–40)
ANION GAP SERPL CALCULATED.3IONS-SCNC: 17 MMOL/L (ref 3–16)
AST SERPL-CCNC: 29 U/L (ref 15–37)
BASOPHILS ABSOLUTE: 0 K/UL (ref 0–0.2)
BASOPHILS RELATIVE PERCENT: 0.3 %
BILIRUB SERPL-MCNC: 0.4 MG/DL (ref 0–1)
BUN BLDV-MCNC: 11 MG/DL (ref 7–20)
CALCIUM SERPL-MCNC: 10.2 MG/DL (ref 8.3–10.6)
CHLORIDE BLD-SCNC: 99 MMOL/L (ref 99–110)
CHOLESTEROL, TOTAL: 257 MG/DL (ref 0–199)
CO2: 23 MMOL/L (ref 21–32)
CREAT SERPL-MCNC: 0.6 MG/DL (ref 0.6–1.1)
EOSINOPHILS ABSOLUTE: 0.1 K/UL (ref 0–0.6)
EOSINOPHILS RELATIVE PERCENT: 1.6 %
FERRITIN: 161.6 NG/ML (ref 15–150)
GFR SERPL CREATININE-BSD FRML MDRD: >60 ML/MIN/{1.73_M2}
GLUCOSE BLD-MCNC: 86 MG/DL (ref 70–99)
HCT VFR BLD CALC: 40.5 % (ref 36–48)
HDLC SERPL-MCNC: 93 MG/DL (ref 40–60)
HEMOGLOBIN: 13.5 G/DL (ref 12–16)
IRON SATURATION: 43 % (ref 15–50)
IRON: 126 UG/DL (ref 37–145)
LDL CHOLESTEROL CALCULATED: 142 MG/DL
LYMPHOCYTES ABSOLUTE: 2.8 K/UL (ref 1–5.1)
LYMPHOCYTES RELATIVE PERCENT: 45.4 %
MCH RBC QN AUTO: 30.1 PG (ref 26–34)
MCHC RBC AUTO-ENTMCNC: 33.3 G/DL (ref 31–36)
MCV RBC AUTO: 90.6 FL (ref 80–100)
MONOCYTES ABSOLUTE: 0.4 K/UL (ref 0–1.3)
MONOCYTES RELATIVE PERCENT: 6.5 %
NEUTROPHILS ABSOLUTE: 2.8 K/UL (ref 1.7–7.7)
NEUTROPHILS RELATIVE PERCENT: 46.2 %
PDW BLD-RTO: 13.7 % (ref 12.4–15.4)
PLATELET # BLD: 222 K/UL (ref 135–450)
PMV BLD AUTO: 8.5 FL (ref 5–10.5)
POTASSIUM SERPL-SCNC: 4.2 MMOL/L (ref 3.5–5.1)
RBC # BLD: 4.47 M/UL (ref 4–5.2)
SODIUM BLD-SCNC: 139 MMOL/L (ref 136–145)
TOTAL IRON BINDING CAPACITY: 292 UG/DL (ref 260–445)
TOTAL PROTEIN: 8 G/DL (ref 6.4–8.2)
TRIGL SERPL-MCNC: 110 MG/DL (ref 0–150)
VITAMIN D 25-HYDROXY: 56.9 NG/ML
VLDLC SERPL CALC-MCNC: 22 MG/DL
WBC # BLD: 6.1 K/UL (ref 4–11)

## 2023-02-06 PROCEDURE — 83540 ASSAY OF IRON: CPT

## 2023-02-06 PROCEDURE — 36415 COLL VENOUS BLD VENIPUNCTURE: CPT

## 2023-02-06 PROCEDURE — 82728 ASSAY OF FERRITIN: CPT

## 2023-02-06 PROCEDURE — 82306 VITAMIN D 25 HYDROXY: CPT

## 2023-02-06 PROCEDURE — 80053 COMPREHEN METABOLIC PANEL: CPT

## 2023-02-06 PROCEDURE — 80061 LIPID PANEL: CPT

## 2023-02-06 PROCEDURE — 83550 IRON BINDING TEST: CPT

## 2023-02-06 PROCEDURE — 99395 PREV VISIT EST AGE 18-39: CPT | Performed by: NURSE PRACTITIONER

## 2023-02-06 PROCEDURE — 85025 COMPLETE CBC W/AUTO DIFF WBC: CPT

## 2023-02-06 ASSESSMENT — ENCOUNTER SYMPTOMS
DIARRHEA: 0
NAUSEA: 0
VOMITING: 0
FACIAL SWELLING: 0
COUGH: 0
SORE THROAT: 0
SINUS PRESSURE: 0

## 2023-02-06 NOTE — PROGRESS NOTES
[facility]  1990        Chief Complaint   Patient presents with    Annual Exam       Assessment/Plan:     1. Routine general medical examination at a health care facility  Continue healthy lifestyle choices. Enc exercise regularly and good dietary intake. Recommend annual eye exams; biannual dental exams. Update GYN appt - given names. - Vitamin D 25 Hydroxy; Future  - Comprehensive Metabolic Panel; Future  - Lipid Panel; Future  - CBC with Auto Differential; Future  - Iron and TIBC; Future  - Ferritin; Future    2. Mild intermittent asthma without complication  Well controlled    3. Iron deficiency anemia secondary to inadequate dietary iron intake  Check labs. 4. Dermatitis  Well controlled on anti histamine daily    5. Overactive bladder  Consider pelvic floor therapy vs Urology f/u - will contact Mariya Fermin to review case. Return for Physical.      HPI:      Asthma. Exercise induced - no flare up since high school. No albuterol inh needed. GYN. Midwife, due Feb 2023 - hx Midwife, [facility]. Denies concerns but does need to find new GYN as her provider moved. Summer 2022 dx - dermatitis. She will take anti histamine daily. Dr Irvin Frankel, [facility]. Hx anxiety medication 4-5 years ago. Very low dose for 18 months. No anxiety attacks. Decreased contact with her mother whom was a large trigger in the past.     Overactive bladder. Hydrates well. Denies incontinence. Has seen Urology ~ 24 y/o. Does drink caffeine - 1-2 cup coffee. Has used medication in the past.     Daughter suffering with third re occurrence of Pinworms. Tdap - ? [facility].       /68 (Site: Right Upper Arm, Position: Sitting, Cuff Size: Medium Adult)   Pulse 81   Resp 18   Ht 5' 2\" (1.575 m)   Wt 127 lb 12.8 oz (58 kg)   SpO2 98%   BMI 23.37 kg/m²     Prior to Visit Medications    Medication Sig Taking?  Authorizing Provider   vitamin D3 (CHOLECALCIFEROL) 10 MCG (400 UNIT) TABS tablet Take 400 Units by mouth daily Yes Historical Provider, MD   b complex vitamins capsule Take 1 capsule by mouth daily Yes Historical Provider, MD   zinc gluconate 50 MG tablet Take 50 mg by mouth daily Yes Historical Provider, MD   magnesium 30 MG tablet Take 30 mg by mouth daily Yes Historical Provider, MD     Family History   Problem Relation Age of Onset    Ulcerative Colitis Mother     Mental Illness Mother     Asthma Father     Asthma Brother     Other Maternal Grandmother     Other Paternal Grandmother         Alzheimer's - 65 y/o    Coronary Art Dis Paternal Grandfather      Social History     Socioeconomic History    Marital status:      Spouse name: Not on file    Number of children: Not on file    Years of education: Not on file    Highest education level: Not on file   Occupational History    Not on file   Tobacco Use    Smoking status: Never    Smokeless tobacco: Never   Vaping Use    Vaping Use: Never used   Substance and Sexual Activity    Alcohol use: Yes     Comment: social    Drug use: Never    Sexual activity: Yes   Other Topics Concern    Not on file   Social History Narrative    Not on file     Social Determinants of Health     Financial Resource Strain: Low Risk     Difficulty of Paying Living Expenses: Not hard at all   Food Insecurity: No Food Insecurity    Worried About Running Out of Food in the Last Year: Never true    Ran Out of Food in the Last Year: Never true   Transportation Needs: Not on file   Physical Activity: Not on file   Stress: Not on file   Social Connections: Not on file   Intimate Partner Violence: Not on file   Housing Stability: Not on file       Review of Systems   Constitutional:  Negative for appetite change, chills, fatigue, fever and unexpected weight change. HENT:  Negative for congestion, ear discharge, ear pain, facial swelling, hearing loss, sinus pressure, sneezing and sore throat. Respiratory:  Negative for cough.     Cardiovascular: Negative for chest pain. Gastrointestinal:  Negative for diarrhea, nausea and vomiting. Genitourinary:  Positive for frequency. Negative for difficulty urinating, dysuria, hematuria and urgency. Musculoskeletal:  Negative for arthralgias and gait problem. Neurological:  Negative for dizziness, weakness and headaches. Hematological:  Negative for adenopathy. Psychiatric/Behavioral:  Negative for sleep disturbance and suicidal ideas. The patient is nervous/anxious (hx, well controlled). Physical Exam  Vitals reviewed. Constitutional:       Appearance: She is normal weight. HENT:      Head: Normocephalic. Right Ear: Tympanic membrane, ear canal and external ear normal.      Left Ear: Tympanic membrane, ear canal and external ear normal.      Nose: Nose normal.      Mouth/Throat:      Mouth: Mucous membranes are moist.      Pharynx: Oropharynx is clear. Eyes:      Extraocular Movements: Extraocular movements intact. Conjunctiva/sclera: Conjunctivae normal.      Pupils: Pupils are equal, round, and reactive to light. Cardiovascular:      Rate and Rhythm: Normal rate and regular rhythm. Pulses: Normal pulses. Heart sounds: Normal heart sounds. Pulmonary:      Effort: Pulmonary effort is normal.      Breath sounds: Normal breath sounds. Abdominal:      General: Abdomen is flat. Bowel sounds are normal.      Palpations: There is no mass. Tenderness: There is no abdominal tenderness. Musculoskeletal:      Right lower leg: No edema. Left lower leg: No edema. Lymphadenopathy:      Cervical: No cervical adenopathy. Neurological:      General: No focal deficit present. Mental Status: She is alert and oriented to person, place, and time. Psychiatric:         Mood and Affect: Mood normal.         Thought Content: Thought content normal.         Judgment: Judgment normal.           See above plan.          Traci Livingston, DOUG - CNP

## 2023-02-13 ENCOUNTER — OFFICE VISIT (OUTPATIENT)
Dept: INTERNAL MEDICINE CLINIC | Age: 33
End: 2023-02-13
Payer: COMMERCIAL

## 2023-02-13 VITALS
HEART RATE: 92 BPM | RESPIRATION RATE: 12 BRPM | BODY MASS INDEX: 23.78 KG/M2 | OXYGEN SATURATION: 98 % | DIASTOLIC BLOOD PRESSURE: 78 MMHG | TEMPERATURE: 98.1 F | WEIGHT: 130 LBS | SYSTOLIC BLOOD PRESSURE: 122 MMHG

## 2023-02-13 DIAGNOSIS — J01.90 ACUTE NON-RECURRENT SINUSITIS, UNSPECIFIED LOCATION: Primary | ICD-10-CM

## 2023-02-13 PROCEDURE — 99213 OFFICE O/P EST LOW 20 MIN: CPT | Performed by: NURSE PRACTITIONER

## 2023-02-13 RX ORDER — AMOXICILLIN AND CLAVULANATE POTASSIUM 875; 125 MG/1; MG/1
1 TABLET, FILM COATED ORAL 2 TIMES DAILY
Qty: 14 TABLET | Refills: 0 | Status: SHIPPED | OUTPATIENT
Start: 2023-02-13 | End: 2023-02-20

## 2023-02-13 SDOH — ECONOMIC STABILITY: HOUSING INSECURITY
IN THE LAST 12 MONTHS, WAS THERE A TIME WHEN YOU DID NOT HAVE A STEADY PLACE TO SLEEP OR SLEPT IN A SHELTER (INCLUDING NOW)?: NO

## 2023-02-13 SDOH — ECONOMIC STABILITY: FOOD INSECURITY: WITHIN THE PAST 12 MONTHS, THE FOOD YOU BOUGHT JUST DIDN'T LAST AND YOU DIDN'T HAVE MONEY TO GET MORE.: NEVER TRUE

## 2023-02-13 SDOH — ECONOMIC STABILITY: INCOME INSECURITY: HOW HARD IS IT FOR YOU TO PAY FOR THE VERY BASICS LIKE FOOD, HOUSING, MEDICAL CARE, AND HEATING?: NOT HARD AT ALL

## 2023-02-13 SDOH — ECONOMIC STABILITY: FOOD INSECURITY: WITHIN THE PAST 12 MONTHS, YOU WORRIED THAT YOUR FOOD WOULD RUN OUT BEFORE YOU GOT MONEY TO BUY MORE.: NEVER TRUE

## 2023-02-13 ASSESSMENT — PATIENT HEALTH QUESTIONNAIRE - PHQ9
5. POOR APPETITE OR OVEREATING: 0
3. TROUBLE FALLING OR STAYING ASLEEP: 0
1. LITTLE INTEREST OR PLEASURE IN DOING THINGS: 0
10. IF YOU CHECKED OFF ANY PROBLEMS, HOW DIFFICULT HAVE THESE PROBLEMS MADE IT FOR YOU TO DO YOUR WORK, TAKE CARE OF THINGS AT HOME, OR GET ALONG WITH OTHER PEOPLE: 0
SUM OF ALL RESPONSES TO PHQ QUESTIONS 1-9: 0
8. MOVING OR SPEAKING SO SLOWLY THAT OTHER PEOPLE COULD HAVE NOTICED. OR THE OPPOSITE, BEING SO FIGETY OR RESTLESS THAT YOU HAVE BEEN MOVING AROUND A LOT MORE THAN USUAL: 0
9. THOUGHTS THAT YOU WOULD BE BETTER OFF DEAD, OR OF HURTING YOURSELF: 0
7. TROUBLE CONCENTRATING ON THINGS, SUCH AS READING THE NEWSPAPER OR WATCHING TELEVISION: 0
6. FEELING BAD ABOUT YOURSELF - OR THAT YOU ARE A FAILURE OR HAVE LET YOURSELF OR YOUR FAMILY DOWN: 0
SUM OF ALL RESPONSES TO PHQ QUESTIONS 1-9: 0
SUM OF ALL RESPONSES TO PHQ9 QUESTIONS 1 & 2: 0
2. FEELING DOWN, DEPRESSED OR HOPELESS: 0
4. FEELING TIRED OR HAVING LITTLE ENERGY: 0

## 2023-02-13 ASSESSMENT — ENCOUNTER SYMPTOMS
SWOLLEN GLANDS: 1
WHEEZING: 0
ABDOMINAL DISTENTION: 0
CHEST TIGHTNESS: 0
SORE THROAT: 1
NAUSEA: 0
SHORTNESS OF BREATH: 0
RHINORRHEA: 1
SINUS PRESSURE: 1
VOICE CHANGE: 1
DIARRHEA: 0
SINUS PAIN: 1
COUGH: 1
CONSTIPATION: 0
VOMITING: 0

## 2023-02-13 ASSESSMENT — ANXIETY QUESTIONNAIRES
3. WORRYING TOO MUCH ABOUT DIFFERENT THINGS: 0
4. TROUBLE RELAXING: 0
IF YOU CHECKED OFF ANY PROBLEMS ON THIS QUESTIONNAIRE, HOW DIFFICULT HAVE THESE PROBLEMS MADE IT FOR YOU TO DO YOUR WORK, TAKE CARE OF THINGS AT HOME, OR GET ALONG WITH OTHER PEOPLE: NOT DIFFICULT AT ALL
1. FEELING NERVOUS, ANXIOUS, OR ON EDGE: 0
6. BECOMING EASILY ANNOYED OR IRRITABLE: 0
GAD7 TOTAL SCORE: 0
2. NOT BEING ABLE TO STOP OR CONTROL WORRYING: 0
5. BEING SO RESTLESS THAT IT IS HARD TO SIT STILL: 0
7. FEELING AFRAID AS IF SOMETHING AWFUL MIGHT HAPPEN: 0

## 2023-02-13 NOTE — PROGRESS NOTES
[facility]  [address]  Hal Jack is a 29 y.o. female who presents today for her medical conditions/complaints as noted below. Kika Pulido is c/o of URI      Chief Complaint   Patient presents with    URI       HPI:     URI   This is a new problem. The current episode started 1 to 4 weeks ago (~2 weeks, states her two young children developed symptoms before she did as well). The problem has been gradually worsening. There has been no fever. Associated symptoms include congestion, coughing, headaches, neck pain, a plugged ear sensation, rhinorrhea, sinus pain, a sore throat and swollen glands. Pertinent negatives include no chest pain, diarrhea, dysuria, ear pain, joint pain, nausea, rash, vomiting or wheezing. She has tried NSAIDs (OTC medications) for the symptoms. The treatment provided no relief. No past medical history on file.      Past Surgical History:   Procedure Laterality Date    SHOULDER SURGERY      UMBILICAL HERNIA REPAIR         Family History   Problem Relation Age of Onset    Ulcerative Colitis Mother     Mental Illness Mother     Asthma Father     Asthma Brother     Other Maternal Grandmother     Other Paternal Grandmother         Alzheimer's - 67 y/o    Coronary Art Dis Paternal Grandfather        Social History     Tobacco Use    Smoking status: Never    Smokeless tobacco: Never   Substance Use Topics    Alcohol use: Yes     Comment: social        Current Outpatient Medications   Medication Sig Dispense Refill    amoxicillin-clavulanate (AUGMENTIN) 875-125 MG per tablet Take 1 tablet by mouth 2 times daily for 7 days 14 tablet 0    vitamin D3 (CHOLECALCIFEROL) 10 MCG (400 UNIT) TABS tablet Take 400 Units by mouth daily      b complex vitamins capsule Take 1 capsule by mouth daily      zinc gluconate 50 MG tablet Take 50 mg by mouth daily      magnesium 30 MG tablet Take 30 mg by mouth daily       No current facility-administered medications for this visit. Allergies   Allergen Reactions    Other Hives    Sulfa Antibiotics Hives       Subjective:      Review of Systems   Constitutional:  Negative for activity change, fatigue, fever and unexpected weight change. HENT:  Positive for congestion, postnasal drip, rhinorrhea, sinus pressure, sinus pain, sore throat and voice change. Negative for ear pain. Respiratory:  Positive for cough. Negative for chest tightness, shortness of breath and wheezing. Cardiovascular:  Negative for chest pain, palpitations and leg swelling. Gastrointestinal:  Negative for abdominal distention, constipation, diarrhea, nausea and vomiting. Genitourinary:  Negative for difficulty urinating, dysuria and urgency. Musculoskeletal:  Positive for neck pain. Negative for joint pain. Skin:  Negative for rash. Neurological:  Positive for headaches. Negative for dizziness, weakness and light-headedness. Hematological:  Positive for adenopathy. Objective:     Vitals:    02/13/23 1141   BP: 122/78   Site: Right Upper Arm   Position: Sitting   Cuff Size: Medium Adult   Pulse: 92   Resp: 12   Temp: 98.1 °F (36.7 °C)   TempSrc: Temporal   SpO2: 98%   Weight: 130 lb (59 kg)       Physical Exam  Vitals and nursing note reviewed. Constitutional:       Appearance: Normal appearance. She is well-developed. She is ill-appearing. HENT:      Head: Normocephalic and atraumatic. Right Ear: Hearing and external ear normal. No middle ear effusion. Left Ear: Hearing and external ear normal.  No middle ear effusion. Nose: Rhinorrhea present. No mucosal edema. Mouth/Throat:      Mouth: No oral lesions. Pharynx: Posterior oropharyngeal erythema present. No oropharyngeal exudate. Eyes:      General: Lids are normal.      Pupils: Pupils are equal, round, and reactive to light.    Cardiovascular:      Rate and Rhythm: Normal rate and regular rhythm. Pulses: Normal pulses. Heart sounds: Normal heart sounds. Pulmonary:      Effort: Pulmonary effort is normal. No accessory muscle usage or respiratory distress. Breath sounds: Normal breath sounds. Abdominal:      General: Bowel sounds are normal.      Palpations: Abdomen is soft. Musculoskeletal:         General: Normal range of motion. Cervical back: Normal range of motion. Lymphadenopathy:      Head:      Right side of head: Submandibular and tonsillar adenopathy present. Left side of head: Submandibular and tonsillar adenopathy present. Skin:     General: Skin is warm and dry. Neurological:      Mental Status: She is alert and oriented to person, place, and time. Psychiatric:         Speech: Speech normal.       Assessment & Plan: The following diagnoses and conditions are stable with no further orders unless indicated:    1. Acute non-recurrent sinusitis, unspecified location        [facility] was seen today for uri. Diagnoses and all orders for this visit:    Acute non-recurrent sinusitis, unspecified location  -     amoxicillin-clavulanate (AUGMENTIN) 875-125 MG per tablet; Take 1 tablet by mouth 2 times daily for 7 days    Encouraged robitussin/mucinex to assist with expectoration/cough suppression. Flonase to relieve sinus congestion. Warm fluids with honey, humidifier to assist in moistening of secretion recommended. Encouraged increased nutrition/hydration/rest while recovering. Abx as she has upcoming traveling and symptoms have been persistent for over a week    No follow-ups on file. Patientshould call the office immediately with new or ongoing signs or symptoms or worsening, or proceed to the emergency room. If you are on medications which could impair your senses, you are at risk of weakness, falls,dizziness, or drowsiness.   You should be careful during activities which could place you at risk of harm, such as climbing, using stairs, operating machinery, or driving vehicles.  If you feel you cannot safely do theseactivities, you should request others to help you, or avoid the activities altogether. If you are drowsy for any other reason, you should use the same precautions as listed above.    Call if pattern of symptoms change or persists for an extended time.      Shawn Interiano,FNP-C

## 2023-05-11 ENCOUNTER — PATIENT MESSAGE (OUTPATIENT)
Dept: INTERNAL MEDICINE CLINIC | Age: 33
End: 2023-05-11

## 2023-05-11 DIAGNOSIS — L29.9 PRURITUS: Primary | ICD-10-CM

## 2023-05-11 DIAGNOSIS — R63.5 WEIGHT GAIN: ICD-10-CM

## 2023-05-12 NOTE — TELEPHONE ENCOUNTER
From: Rody  To: Michael Diaz  Sent: 5/11/2023 7:28 AM EDT  Subject: Request for blood work     Art Hinojosa, can I please have an order for a Hepatic Panel to test AST/ALT and ALP? Unfortunately Im still experiencing the same symptoms that I came to you about in late august/early September. Id like to get this blood work done asap. Thank you!

## 2023-05-15 ENCOUNTER — HOSPITAL ENCOUNTER (OUTPATIENT)
Dept: ULTRASOUND IMAGING | Age: 33
Discharge: HOME | End: 2023-05-15
Payer: COMMERCIAL

## 2023-05-15 DIAGNOSIS — L29.9 PRURITUS: ICD-10-CM

## 2023-05-15 DIAGNOSIS — R63.5 WEIGHT GAIN: ICD-10-CM

## 2023-05-15 PROCEDURE — 76705 ECHO EXAM OF ABDOMEN: CPT
